# Patient Record
Sex: FEMALE | Race: WHITE | Employment: STUDENT | URBAN - METROPOLITAN AREA
[De-identification: names, ages, dates, MRNs, and addresses within clinical notes are randomized per-mention and may not be internally consistent; named-entity substitution may affect disease eponyms.]

---

## 2017-01-24 DIAGNOSIS — N94.6 DYSMENORRHEA: Primary | ICD-10-CM

## 2017-01-24 NOTE — TELEPHONE ENCOUNTER
LOV 12/9/15. Future Appointments  Date Time Provider Nikita Rowlandi   2/7/2017 12:00 PM ANTONIA Goncalves [facility] [place]     Refill request for Aviane. Routed to Tre[place] KNIGHT.

## 2017-01-24 NOTE — TELEPHONE ENCOUNTER
Pt requesting 30 refill Levonorgestrel-Ethinyl Estrad (AVIANE) 0.1-20 MG-MCG Oral Tab to [facility].   Pt has appt 2/7

## 2017-01-25 RX ORDER — LEVONORGESTREL AND ETHINYL ESTRADIOL 0.1-0.02MG
1 KIT ORAL DAILY
Qty: 30 TABLET | Refills: 0 | Status: SHIPPED | OUTPATIENT
Start: 2017-01-25 | End: 2017-09-07 | Stop reason: ALTCHOICE

## 2017-01-25 NOTE — TELEPHONE ENCOUNTER
Rx for med Levonorgestrel-Ethinyl Estrad (Aviane) 0.1-20 mg # 30 with 0 refills sent to [facility].     Future Appointments  Date Time Provider Nikita Londono   2/7/2017 12:00 PM ANTONIA Larsen [facility] [place]

## 2017-09-07 ENCOUNTER — OFFICE VISIT (OUTPATIENT)
Dept: FAMILY MEDICINE CLINIC | Facility: CLINIC | Age: 27
End: 2017-09-07

## 2017-09-07 VITALS
HEIGHT: 62.13 IN | SYSTOLIC BLOOD PRESSURE: 102 MMHG | DIASTOLIC BLOOD PRESSURE: 60 MMHG | BODY MASS INDEX: 19.26 KG/M2 | HEART RATE: 80 BPM | RESPIRATION RATE: 10 BRPM | WEIGHT: 106 LBS

## 2017-09-07 DIAGNOSIS — Z30.011 ENCOUNTER FOR INITIAL PRESCRIPTION OF CONTRACEPTIVE PILLS: ICD-10-CM

## 2017-09-07 DIAGNOSIS — Z12.4 SCREENING FOR CERVICAL CANCER: ICD-10-CM

## 2017-09-07 DIAGNOSIS — Z01.419 WELL WOMAN EXAM WITH ROUTINE GYNECOLOGICAL EXAM: Primary | ICD-10-CM

## 2017-09-07 DIAGNOSIS — Z23 NEED FOR VACCINATION: ICD-10-CM

## 2017-09-07 PROCEDURE — 90471 IMMUNIZATION ADMIN: CPT | Performed by: FAMILY MEDICINE

## 2017-09-07 PROCEDURE — 88175 CYTOPATH C/V AUTO FLUID REDO: CPT | Performed by: FAMILY MEDICINE

## 2017-09-07 PROCEDURE — 99395 PREV VISIT EST AGE 18-39: CPT | Performed by: FAMILY MEDICINE

## 2017-09-07 PROCEDURE — 90715 TDAP VACCINE 7 YRS/> IM: CPT | Performed by: FAMILY MEDICINE

## 2017-09-07 RX ORDER — NORETHINDRONE ACETATE AND ETHINYL ESTRADIOL 1MG-20(21)
1 KIT ORAL DAILY
Qty: 3 PACKAGE | Refills: 3 | Status: SHIPPED | OUTPATIENT
Start: 2017-09-07 | End: 2018-08-05

## 2017-09-07 NOTE — PROGRESS NOTES
SUBJECTIVE:  Patient presents with:  Physical: WWE with pap. Would like to resume OCP - prefers something low dose. HPI:  Previously on OCPs since age 14. Does not like being on it but would like to prevent pregnancy.   Patient denies: smoking, HTN/eleva week    Allergies:  No Known Allergies    OBJECTIVE:  PHYSICAL EXAM:   09/07/17  1134   BP: 102/60   Pulse: 80   Resp: 10   Weight: 106 lb   Height: 62.13\"     Physical Examination: General appearance - alert, well appearing, and in no distress and normal for initial prescription of contraceptive pills  I have discussed the options with her including patches, Nuva Ring, Nexplanon, IUD, injections and oral contraceptives, and at this point she would like to proceed with OCPs.  She has no absolute contraindica

## 2017-09-08 LAB
LAST PAP RESULT: NORMAL
PAP HISTORY (OTHER THAN LAST PAP): NORMAL

## 2017-09-11 ENCOUNTER — TELEPHONE (OUTPATIENT)
Dept: FAMILY MEDICINE CLINIC | Facility: CLINIC | Age: 27
End: 2017-09-11

## 2017-09-11 DIAGNOSIS — B96.89 BV (BACTERIAL VAGINOSIS): Primary | ICD-10-CM

## 2017-09-11 DIAGNOSIS — N76.0 BV (BACTERIAL VAGINOSIS): Primary | ICD-10-CM

## 2017-09-11 RX ORDER — METRONIDAZOLE 500 MG/1
500 TABLET ORAL 2 TIMES DAILY
Qty: 14 TABLET | Refills: 0 | Status: SHIPPED | OUTPATIENT
Start: 2017-09-11 | End: 2019-01-14 | Stop reason: ALTCHOICE

## 2017-09-11 NOTE — TELEPHONE ENCOUNTER
Talked to patient she denies redness or swelling to area has been taking ibuprofen for pain I asked her to apply heat to the area for 2 days and if not better we need to see her she agrees to this plan

## 2018-08-05 DIAGNOSIS — Z30.011 ENCOUNTER FOR INITIAL PRESCRIPTION OF CONTRACEPTIVE PILLS: ICD-10-CM

## 2018-08-06 RX ORDER — NORETHINDRONE ACETATE AND ETHINYL ESTRADIOL AND FERROUS FUMARATE 1MG-20(21)
1 KIT ORAL DAILY
Qty: 84 TABLET | Refills: 1 | Status: SHIPPED | OUTPATIENT
Start: 2018-08-06 | End: 2019-01-14

## 2019-01-13 DIAGNOSIS — Z30.011 ENCOUNTER FOR INITIAL PRESCRIPTION OF CONTRACEPTIVE PILLS: ICD-10-CM

## 2019-01-13 RX ORDER — NORETHINDRONE ACETATE AND ETHINYL ESTRADIOL AND FERROUS FUMARATE 1MG-20(21)
1 KIT ORAL DAILY
Qty: 84 TABLET | Refills: 1 | Status: CANCELLED | OUTPATIENT
Start: 2019-01-13

## 2019-01-14 ENCOUNTER — OFFICE VISIT (OUTPATIENT)
Dept: FAMILY MEDICINE CLINIC | Facility: CLINIC | Age: 29
End: 2019-01-14
Payer: COMMERCIAL

## 2019-01-14 VITALS
TEMPERATURE: 98 F | HEIGHT: 62.75 IN | BODY MASS INDEX: 19.2 KG/M2 | HEART RATE: 70 BPM | SYSTOLIC BLOOD PRESSURE: 100 MMHG | WEIGHT: 107 LBS | DIASTOLIC BLOOD PRESSURE: 60 MMHG | RESPIRATION RATE: 16 BRPM

## 2019-01-14 DIAGNOSIS — N90.89 SKIN TAG OF FEMALE PERINEUM: ICD-10-CM

## 2019-01-14 DIAGNOSIS — Z00.00 WELL WOMAN EXAM WITHOUT GYNECOLOGICAL EXAM: Primary | ICD-10-CM

## 2019-01-14 DIAGNOSIS — Z11.3 ROUTINE SCREENING FOR STI (SEXUALLY TRANSMITTED INFECTION): ICD-10-CM

## 2019-01-14 DIAGNOSIS — Z30.011 ENCOUNTER FOR INITIAL PRESCRIPTION OF CONTRACEPTIVE PILLS: ICD-10-CM

## 2019-01-14 PROCEDURE — 99395 PREV VISIT EST AGE 18-39: CPT | Performed by: FAMILY MEDICINE

## 2019-01-14 RX ORDER — NORETHINDRONE ACETATE AND ETHINYL ESTRADIOL 1MG-20(21)
1 KIT ORAL DAILY
Qty: 84 TABLET | Refills: 3 | Status: SHIPPED | OUTPATIENT
Start: 2019-01-14 | End: 2019-12-02

## 2019-01-14 NOTE — PROGRESS NOTES
SUBJECTIVE:  Patient presents with:  Physical: WWE no pap   Vaginal Problem: x 1 month- skin tag     HPI:  Notes skin tags in the vaginal area. On OCPs for contraception and dysmenorrhea.  Patient denies: smoking (in age >/= 28), HTN/elevated BP, Hx of and normal appearing weight  Mental status - alert, oriented to person, place, and time, normal mood, behavior, speech, dress, motor activity, and thought processes  Mouth - mucous membranes moist, pharynx normal without lesions  Neck - supple, no signific well.    Well woman exam without gynecological exam  Vaccines: Indicated today:Declines influenza  Obesity screening: Body mass index is 19.11 kg/m².  Discussed healthy diet and exercise in detail today  Hypertension screening: normotensive  Depression scre

## 2019-01-15 LAB
CHLAMYDIA TRACHOMATIS$RNA, TMA: NOT DETECTED
NEISSERIA GONORRHOEAE$RNA, TMA: NOT DETECTED

## 2019-12-02 DIAGNOSIS — Z30.011 ENCOUNTER FOR INITIAL PRESCRIPTION OF CONTRACEPTIVE PILLS: ICD-10-CM

## 2019-12-05 RX ORDER — NORETHINDRONE ACETATE AND ETHINYL ESTRADIOL AND FERROUS FUMARATE 1MG-20(21)
KIT ORAL
Qty: 84 TABLET | Refills: 3 | Status: SHIPPED | OUTPATIENT
Start: 2019-12-05 | End: 2020-12-16

## 2020-12-16 ENCOUNTER — TELEPHONE (OUTPATIENT)
Dept: FAMILY MEDICINE CLINIC | Facility: CLINIC | Age: 30
End: 2020-12-16

## 2020-12-16 DIAGNOSIS — Z30.011 ENCOUNTER FOR INITIAL PRESCRIPTION OF CONTRACEPTIVE PILLS: ICD-10-CM

## 2020-12-16 RX ORDER — NORETHINDRONE ACETATE AND ETHINYL ESTRADIOL 1MG-20(21)
1 KIT ORAL DAILY
Qty: 28 TABLET | Refills: 0 | Status: SHIPPED | OUTPATIENT
Start: 2020-12-16 | End: 2021-01-05

## 2020-12-16 NOTE — TELEPHONE ENCOUNTER
Pt is calling she scheduled her physical with Dr. Danilo Vela for 1/19/21 at 11:40 am.  She knows she hasn't been in for a while.   Can we refill her birth control for one month to get to the appt for the Junel FE 1/20 1-20 MG-MCG Oral Tab and call it into [facility]

## 2021-01-05 DIAGNOSIS — Z30.011 ENCOUNTER FOR INITIAL PRESCRIPTION OF CONTRACEPTIVE PILLS: ICD-10-CM

## 2021-01-05 RX ORDER — NORETHINDRONE ACETATE AND ETHINYL ESTRADIOL AND FERROUS FUMARATE 1MG-20(21)
KIT ORAL
Qty: 28 TABLET | Refills: 0 | Status: SHIPPED | OUTPATIENT
Start: 2021-01-05 | End: 2021-01-29

## 2021-01-05 NOTE — TELEPHONE ENCOUNTER
Requested Prescriptions     Pending Prescriptions Disp Refills   • JUNEL FE 1/20 1-20 MG-MCG Oral Tab [Pharmacy Med Name: [facility] FE 1 MG-20 MCG TABLET] 28 tablet 0     Sig: TAKE 1 TABLET BY MOUTH EVERY DAY     LOV 1/14/2019    Patient was asked to follow-up

## 2021-01-27 DIAGNOSIS — Z30.011 ENCOUNTER FOR INITIAL PRESCRIPTION OF CONTRACEPTIVE PILLS: ICD-10-CM

## 2021-01-29 RX ORDER — NORETHINDRONE ACETATE AND ETHINYL ESTRADIOL 1MG-20(21)
1 KIT ORAL DAILY
Qty: 28 TABLET | Refills: 2 | Status: SHIPPED | OUTPATIENT
Start: 2021-01-29 | End: 2021-04-19

## 2021-04-19 DIAGNOSIS — Z30.011 ENCOUNTER FOR INITIAL PRESCRIPTION OF CONTRACEPTIVE PILLS: ICD-10-CM

## 2021-04-19 RX ORDER — NORETHINDRONE ACETATE AND ETHINYL ESTRADIOL AND FERROUS FUMARATE 1MG-20(21)
KIT ORAL
Qty: 84 TABLET | Refills: 0 | Status: SHIPPED | OUTPATIENT
Start: 2021-04-19

## 2021-04-19 NOTE — TELEPHONE ENCOUNTER
LM for patient to schedule her physical and to let her know that 3 months of BC has been sent in but no further refills will be sent until she has her physical. She has not been seen since 2019

## 2021-04-19 NOTE — TELEPHONE ENCOUNTER
Requested Prescriptions     Pending Prescriptions Disp Refills   • JUNEL FE 1/20 1-20 MG-MCG Oral Tab [Pharmacy Med Name: [facility] FE 1 MG-20 MCG TABLET] 84 tablet 0     Sig: TAKE 1 TABLET BY MOUTH EVERY DAY     LOV 01-    Patient was asked to follow-u

## 2021-05-06 NOTE — TELEPHONE ENCOUNTER
Third attempt called pt someone tried to say hello call was disconnected. Called back unable to [facility] mailbox is full. 3 tires letter sent.

## 2021-05-26 ENCOUNTER — TELEPHONE (OUTPATIENT)
Dept: FAMILY MEDICINE CLINIC | Facility: CLINIC | Age: 31
End: 2021-05-26

## 2021-06-07 NOTE — TELEPHONE ENCOUNTER
Pt is no longer our patient she has moved 2 years ago. Please remove Dr. Ramos Headings as her primary physician.

## 2021-07-08 DIAGNOSIS — Z30.011 ENCOUNTER FOR INITIAL PRESCRIPTION OF CONTRACEPTIVE PILLS: ICD-10-CM

## 2021-07-12 RX ORDER — ERGOCALCIFEROL 1.25 MG/1
CAPSULE ORAL
COMMUNITY
Start: 2021-05-25

## 2021-07-12 RX ORDER — ESCITALOPRAM OXALATE 10 MG/1
TABLET ORAL
COMMUNITY
Start: 2021-05-24

## 2021-07-12 RX ORDER — NORETHINDRONE ACETATE AND ETHINYL ESTRADIOL AND FERROUS FUMARATE 1MG-20(21)
KIT ORAL
Qty: 84 TABLET | Refills: 0 | OUTPATIENT
Start: 2021-07-12

## 2021-07-12 NOTE — TELEPHONE ENCOUNTER
JUNEL FE 1 MG-20 MCG TABLET     Sig: TAKE 1 TABLET BY MOUTH EVERY DAY    Disp:  84 tablet    Refills:  0 (Pharmacy requested: Not specified)    Start: 7/8/2021    Class: Normal    Non-formulary For: Encounter for initial prescription of contraceptive pills

## 2021-07-12 NOTE — TELEPHONE ENCOUNTER
Last pap 09/07/2017 - normal/w BV diagnosis  Past due for physical and pap  Noted in her chart she is not longer out patient